# Patient Record
Sex: FEMALE | URBAN - METROPOLITAN AREA
[De-identification: names, ages, dates, MRNs, and addresses within clinical notes are randomized per-mention and may not be internally consistent; named-entity substitution may affect disease eponyms.]

---

## 2022-01-26 ENCOUNTER — OFFICE VISIT (OUTPATIENT)
Dept: URBAN - METROPOLITAN AREA TELEHEALTH 2 | Facility: TELEHEALTH | Age: 27
End: 2022-01-26
Payer: MEDICAID

## 2022-01-26 DIAGNOSIS — R63.4 WEIGHT LOSS: ICD-10-CM

## 2022-01-26 PROCEDURE — 97802 MEDICAL NUTRITION INDIV IN: CPT | Performed by: DIETITIAN, REGISTERED

## 2022-01-26 NOTE — HPI-TODAY'S VISIT:
Nutrition initial visit:  Patient found me through her insurance.  Patient is obese.  63"  220lbs.  She has been over 180 pounds for many years.  She was overweight as a child.  She eats 3 meals per day.  Recently she has been trying a low domen[place] diet.  Breakfast at 10am  1 egg, whole wheat toast, cottage cheese and half a banana. At 1-2pm  butternut squash soup and toast.  Dinner:  protein like fish or chicken and some rice and veg.  She lost 5 pounds on low domenica diet in 3 weeks.